# Patient Record
Sex: MALE | Race: BLACK OR AFRICAN AMERICAN | NOT HISPANIC OR LATINO | URBAN - NONMETROPOLITAN AREA
[De-identification: names, ages, dates, MRNs, and addresses within clinical notes are randomized per-mention and may not be internally consistent; named-entity substitution may affect disease eponyms.]

---

## 2024-08-14 ENCOUNTER — OFFICE (OUTPATIENT)
Dept: URBAN - NONMETROPOLITAN AREA CLINIC 1 | Facility: CLINIC | Age: 59
End: 2024-08-14
Payer: COMMERCIAL

## 2024-08-14 VITALS
DIASTOLIC BLOOD PRESSURE: 82 MMHG | HEART RATE: 63 BPM | WEIGHT: 142 LBS | SYSTOLIC BLOOD PRESSURE: 151 MMHG | HEIGHT: 65 IN

## 2024-08-14 DIAGNOSIS — K21.9 GASTRO-ESOPHAGEAL REFLUX DISEASE WITHOUT ESOPHAGITIS: ICD-10-CM

## 2024-08-14 PROCEDURE — 99213 OFFICE O/P EST LOW 20 MIN: CPT | Performed by: NURSE PRACTITIONER

## 2024-08-14 NOTE — SERVICENOTES
He is advised to use the lowest dose PPI or H2 blocker.  Discontinue if he does not have   Reflux symptoms.  His colonoscopy will be due 2032.

## 2024-08-14 NOTE — SERVICEHPINOTES
The patient is unclear on why he was appointed to see us. We have requested records.
br He does not have GI complaints today.  No nausea or vomiting or abdominal pain.    He has normal bowel movements and does not see blood with his stools.  I saw him January 2023 after he had been to the ER with abdominal pain. His CT showed "gastric wall thickening, may be on the basis of non distention or gastritis".  He was taking omeprazole 40 mg daily and his symptoms were much improved.  He declined EGD at the time.   He has intermittent bouts of heartburn and uses omeprazole prn. Otherwise no GI symptoms.br 
brColonoscopy 4/26/21 by Dr. Sherwood-  In the distal colon a diminutive polyp 4 mm in diameter was seen and completely excised by snare.  The polyp was retrieved.  In the rectum a few medium-sized internal hemorrhoids were seen.br   DIAGNOSIS:brColon, sigmoid, polypectomy:br  Hyperplastic polyp.